# Patient Record
Sex: MALE | Race: WHITE
[De-identification: names, ages, dates, MRNs, and addresses within clinical notes are randomized per-mention and may not be internally consistent; named-entity substitution may affect disease eponyms.]

---

## 2018-07-27 ENCOUNTER — HOSPITAL ENCOUNTER (EMERGENCY)
Dept: HOSPITAL 97 - ER | Age: 48
Discharge: HOME | End: 2018-07-27
Payer: COMMERCIAL

## 2018-07-27 VITALS — SYSTOLIC BLOOD PRESSURE: 122 MMHG | DIASTOLIC BLOOD PRESSURE: 69 MMHG | OXYGEN SATURATION: 95 %

## 2018-07-27 DIAGNOSIS — F17.210: ICD-10-CM

## 2018-07-27 DIAGNOSIS — E66.9: ICD-10-CM
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
## 2018-07-27 DIAGNOSIS — R07.9: Primary | ICD-10-CM

## 2018-07-27 DIAGNOSIS — E11.9: ICD-10-CM

## 2018-07-27 DIAGNOSIS — I10: ICD-10-CM
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
## 2018-07-27 DIAGNOSIS — Z88.5: ICD-10-CM

## 2018-07-27 DIAGNOSIS — Z79.84: ICD-10-CM
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
## 2018-07-27 LAB
ALBUMIN SERPL BCP-MCNC: 3.7 G/DL (ref 3.4–5)
ALP SERPL-CCNC: 101 U/L (ref 45–117)
ALT SERPL W P-5'-P-CCNC: 41 U/L (ref 12–78)
AST SERPL W P-5'-P-CCNC: 23 U/L (ref 15–37)
BUN BLD-MCNC: 21 MG/DL (ref 7–18)
CKMB CREATINE KINASE MB: 3.3 NG/ML (ref 0.3–3.6)
GLUCOSE SERPLBLD-MCNC: 224 MG/DL (ref 74–106)
HCT VFR BLD CALC: 42.2 % (ref 39.6–49)
INR BLD: 0.95
LIPASE SERPL-CCNC: 84 U/L (ref 73–393)
LYMPHOCYTES # SPEC AUTO: 1.2 K/UL (ref 0.7–4.9)
MAGNESIUM SERPL-MCNC: 2 MG/DL (ref 1.8–2.4)
MCH RBC QN AUTO: 31.5 PG (ref 27–35)
MCV RBC: 92.6 FL (ref 80–100)
NT-PROBNP SERPL-MCNC: 36 PG/ML (ref ?–125)
PMV BLD: 10 FL (ref 7.6–11.3)
POTASSIUM SERPL-SCNC: 4.2 MMOL/L (ref 3.5–5.1)
RBC # BLD: 4.56 M/UL (ref 4.33–5.43)

## 2018-07-27 PROCEDURE — 82553 CREATINE MB FRACTION: CPT

## 2018-07-27 PROCEDURE — 71260 CT THORAX DX C+: CPT

## 2018-07-27 PROCEDURE — 85730 THROMBOPLASTIN TIME PARTIAL: CPT

## 2018-07-27 PROCEDURE — 72125 CT NECK SPINE W/O DYE: CPT

## 2018-07-27 PROCEDURE — 83735 ASSAY OF MAGNESIUM: CPT

## 2018-07-27 PROCEDURE — 85610 PROTHROMBIN TIME: CPT

## 2018-07-27 PROCEDURE — 80048 BASIC METABOLIC PNL TOTAL CA: CPT

## 2018-07-27 PROCEDURE — 36415 COLL VENOUS BLD VENIPUNCTURE: CPT

## 2018-07-27 PROCEDURE — 96375 TX/PRO/DX INJ NEW DRUG ADDON: CPT

## 2018-07-27 PROCEDURE — 84484 ASSAY OF TROPONIN QUANT: CPT

## 2018-07-27 PROCEDURE — 80076 HEPATIC FUNCTION PANEL: CPT

## 2018-07-27 PROCEDURE — 83690 ASSAY OF LIPASE: CPT

## 2018-07-27 PROCEDURE — 83880 ASSAY OF NATRIURETIC PEPTIDE: CPT

## 2018-07-27 PROCEDURE — 71045 X-RAY EXAM CHEST 1 VIEW: CPT

## 2018-07-27 PROCEDURE — 74177 CT ABD & PELVIS W/CONTRAST: CPT

## 2018-07-27 PROCEDURE — 99285 EMERGENCY DEPT VISIT HI MDM: CPT

## 2018-07-27 PROCEDURE — 85025 COMPLETE CBC W/AUTO DIFF WBC: CPT

## 2018-07-27 PROCEDURE — 96374 THER/PROPH/DIAG INJ IV PUSH: CPT

## 2018-07-27 PROCEDURE — 82550 ASSAY OF CK (CPK): CPT

## 2018-07-27 PROCEDURE — 70450 CT HEAD/BRAIN W/O DYE: CPT

## 2018-07-27 PROCEDURE — 93005 ELECTROCARDIOGRAM TRACING: CPT

## 2018-07-27 PROCEDURE — 96361 HYDRATE IV INFUSION ADD-ON: CPT

## 2018-07-27 NOTE — RAD REPORT
EXAM DESCRIPTION:  RAD - Chest Single View - 7/27/2018 1:32 pm

 

CLINICAL HISTORY:  COUGH

Chest pain.

 

COMPARISON:  CHEST SINGLE VIEW dated 6/2/2008

 

FINDINGS:  Portable technique limits examination quality.

 

The lungs are grossly clear. The heart is normal in size. No displaced fractures.

 

IMPRESSION:  No acute intrathoracic process suspected.

## 2018-07-27 NOTE — EDPHYS
Physician Documentation                                                                           

 [facility]                                                                

Name: Larry Corea                                                                                 

Age: 48 yrs                                                                                       

Sex: Male                                                                                         

: 1970                                                                                   

MRN: [id number]                                                                                   

Arrival Date: 2018                                                                          

Time: 12:20                                                                                       

Account#: [id number]                                                                            

Bed 5                                                                                             

Private MD: [facility]cal[facility] ED Physician Jacek Aquino                                                                      

HPI:                                                                                              

                                                                                             

13:06 This 48 yrs old  Male presents to ER via Wheelchair with complaints of Chest   sofia 

      Pain.                                                                                       

13:06 The patient or guardian reports chest pain that is located primarily in the anterior    sofia 

      chest wall, left. Onset: 3 day(s) ago. The pain does not radiate. Associated signs and      

      symptoms: The patient has no apparent associated signs or symptoms. The chest pain is       

      described as sharp. Modifying factors: The symptoms are alleviated by the symptoms are      

      aggravated by deep breath, movement, palpation of area, twisting torso. Severity of         

      pain: At its worst the pain was moderate.                                                   

                                                                                                  

Historical:                                                                                       

- Allergies:                                                                                      

12:31 Morphine;                                                                               aj1 

- Home Meds:                                                                                      

12:31 blood pressure [Active]; diabetes pills [Active];                                       aj1 

- PMHx:                                                                                           

12:31 Diabetes - NIDDM; Gout; Hypertension;                                                   aj1 

- PSHx:                                                                                           

12:31 Knee surgery;                                                                           aj1 

                                                                                                  

- Immunization history:: Flu vaccine is up to date.                                               

- Social history:: Smoking status: Patient uses tobacco products, smokes one pack                 

  cigarettes per day.                                                                             

- Ebola Screening: : Patient denies travel to an Ebola-affected area in the 21 days               

  before illness onset.                                                                           

- Family history:: not pertinent.                                                                 

                                                                                                  

                                                                                                  

ROS:                                                                                              

13:06 Constitutional: Negative for fever, chills, and weight loss, Eyes: Negative for injury, sofia 

      pain, redness, and discharge, ENT: Negative for injury, pain, and discharge, Neck:          

      Negative for injury, pain, and swelling, Cardiovascular: Negative for chest pain,           

      palpitations, and edema, Abdomen/GI: Negative for abdominal pain, nausea, vomiting,         

      diarrhea, and constipation, Back: Negative for injury and pain, : Negative for            

      injury, bleeding, discharge, and swelling, MS/Extremity: Negative for injury and            

      deformity, Skin: Negative for injury, rash, and discoloration, Neuro: Negative for          

      headache, weakness, numbness, tingling, and seizure, Psych: Negative for depression,        

      anxiety, suicide ideation, homicidal ideation, and hallucinations, Allergy/Immunology:      

      Negative for hives, rash, and allergies, Endocrine: Negative for neck swelling,             

      polydipsia, polyuria, polyphagia, and marked weight changes, Hematologic/Lymphatic:         

      Negative for swollen nodes, abnormal bleeding, and unusual bruising.                        

13:06 Respiratory: Positive for pleurisy, of the anterior aspect of left upper chest, left        

      lateral anterior chest, left lateral posterior chest, left nipple and left breast,          

      shortness of breath.                                                                        

                                                                                                  

Exam:                                                                                             

13:06 Constitutional:  This is a well developed, well nourished patient who is awake, alert,  sofia 

      and in no acute distress. Head/Face:  Normocephalic, atraumatic. Eyes:  Pupils equal        

      round and reactive to light, extra-ocular motions intact.  Lids and lashes normal.          

      Conjunctiva and sclera are non-icteric and not injected.  Cornea within normal limits.      

      Periorbital areas with no swelling, redness, or edema. ENT:  Nares patent. No nasal         

      discharge, no septal abnormalities noted.  Tympanic membranes are normal and external       

      auditory canals are clear.  Oropharynx with no redness, swelling, or masses, exudates,      

      or evidence of obstruction, uvula midline.  Mucous membranes moist. Neck:  Trachea          

      midline, no thyromegaly or masses palpated, and no cervical lymphadenopathy.  Supple,       

      full range of motion without nuchal rigidity, or vertebral point tenderness.  No            

      Meningismus. Chest/axilla:  Normal chest wall appearance and motion.  Nontender with no     

      deformity.  No lesions are appreciated. Cardiovascular:  Regular rate and rhythm with a     

      normal S1 and S2.  No gallops, murmurs, or rubs.  Normal PMI, no JVD.  No pulse             

      deficits. Abdomen/GI:  Soft, non-tender, with normal bowel sounds.  No distension or        

      tympany.  No guarding or rebound.  No evidence of tenderness throughout. Back:  No          

      spinal tenderness.  No costovertebral tenderness.  Full range of motion. Male :           

      Normal genitalia with no discharge or lesions. Skin:  Warm, dry with normal turgor.         

      Normal color with no rashes, no lesions, and no evidence of cellulitis. MS/ Extremity:      

      Pulses equal, no cyanosis.  Neurovascular intact.  Full, normal range of motion. Neuro:     

       Awake and alert, GCS 15, oriented to person, place, time, and situation.  Cranial          

      nerves II-XII grossly intact.  Motor strength 5/5 in all extremities.  Sensory grossly      

      intact.  Cerebellar exam normal.  Normal gait. Psych:  Awake, alert, with orientation       

      to person, place and time.  Behavior, mood, and affect are within normal limits.            

13:06 Respiratory: the patient does not display signs of respiratory distress,  Respirations:     

      normal, Breath sounds: are clear throughout, Respiratory rate:  24                          

                                                                                                  

Vital Signs:                                                                                      

12:31  / 69; Pulse 114; Resp 24; Pulse Ox 95% on R/A; Weight 185.52 kg (R); Height 6    aj1 

      ft. 3 in. (190.50 cm) (R); Pain 10/10;                                                      

12:31 Body Mass Index 51.12 (185.52 kg, 190.50 cm)                                            aj1 

                                                                                                  

MDM:                                                                                              

12:32 Patient medically screened.                                                             [facility] 

13:06 Data reviewed: vital signs, nurses notes, lab test result(s), EKG, radiologic studies,  [facility] 

      CT scan, plain films.                                                                       

                                                                                                  

                                                                                             

13:06 Order name: Basic Metabolic Panel; Complete Time: 14:36                                 [facility] 

                                                                                             

13:06 Order name: CBC with Diff                                                               [facility] 

                                                                                             

13:06 Order name: Ckmb; Complete Time: 14:36                                                  [facility] 

                                                                                             

13:06 Order name: CPK; Complete Time: 14:36                                                   [facility] 

                                                                                             

13:06 Order name: LFT's; Complete Time: 14:36                                                 [facility] 

                                                                                             

13:06 Order name: Magnesium; Complete Time: 14:36                                             [facility]ia 

                                                                                             

13:06 Order name: NT PRO-BNP; Complete Time: 14:36                                            [facility] 

                                                                                             

13:06 Order name: PT-INR; Complete Time: 14:36                                                [facility] 

                                                                                             

13:06 Order name: Ptt, Activated; Complete Time: 14:36                                        [facility] 

                                                                                             

13:06 Order name: Troponin (emerg Dept Use Only); Complete Time: 14:36                        [facility] 

                                                                                             

13:06 Order name: XRAY Chest (1 view); Complete Time: 15:09                                   [facility] 

                                                                                             

13:06 Order name: Lipase; Complete Time: 14:36                                                [facility] 

                                                                                             

13:06 Order name: CT Traumagram (Head C Spine CAP W Con); Complete Time: 15:09                                                                                                             

13:33 Order name: INCENTIVE SPIROMETRY                                                        [facility] 

                                                                                             

13:06 Order name: EKG; Complete Time: 13:07                                                   [facility] 

                                                                                             

13:06 Order name: Cardiac monitoring; Complete Time: 14:18                                    [facility] 

                                                                                             

13:06 Order name: EKG - Nurse/Tech; Complete Time: 14:                                      [facility] 

                                                                                             

13:06 Order name: IV Saline Lock; Complete Time: :                                        [facility] 

                                                                                             

13:06 Order name: Labs collected and sent; Complete Time: :                               [facility] 

                                                                                             

13:06 Order name: O2 Per Protocol; Complete Time: :                                       [facility] 

                                                                                             

13:06 Order name: O2 Sat Monitoring; Complete Time: :                                     [facility] 

                                                                                                  

Administered Medications:                                                                         

14:17 Drug: fentaNYL (PF) 50 mcg Route: IVP; Site: right antecubital;                         ph  

15:30 Follow up: Response: No adverse reaction                                                ph  

14:17 Drug: Zofran 4 mg Route: IVP; Site: right antecubital;                                  ph  

15:30 Follow up: Response: No adverse reaction                                                ph  

14:18 Drug: NS 0.9% 1000 ml Route: IV; Rate: 1 bolus; Site: right antecubital;                ph  

19:36 Follow up: Response: No adverse reaction; IV Status: Completed infusion                 ph  

                                                                                                  

                                                                                                  

Disposition:                                                                                      

18 15:10 Discharged to Home. Impression: Chest pain, unspecified - wall, Fall due to        

  bumping against object, Type 2 diabetes mellitus, Obesity, unspecified.                         

- Condition is Stable.                                                                            

- Discharge Instructions: Chest Wall Pain, Fall Prevention in the Home, Chest Wall                

  Pain, Easy-to-Read, Fall Prevention in the Home, Easy-to-Read.                                  

- Prescriptions for Ibuprofen 600 mg Oral Tablet - take 1 tablet by ORAL route every 6            

  hours As needed take with food; 2 tablet. Tylenol- Codeine #3 300-30 mg Oral Tablet -           

  take 2 tablet by ORAL route every 6 hours As needed; 30 tablet.                                 

- Medication Reconciliation Form, Thank You Letter, Antibiotic Education, Prescription            

  Opioid Use form.                                                                                

- Follow up: Fernando Alcala; When: 2 - 3 days; Reason: Recheck today's complaints,                    

  Continuance of care, Re-evaluation by your physician. Follow up: Daniel Burch;                

  When: 2 - 3 days; Reason: Recheck today's complaints, Continuance of care,                      

  Re-evaluation by your physician.                                                                

- Problem is new.                                                                                 

- Symptoms have improved.                                                                         

                                                                                                  

                                                                                                  

                                                                                                  

Signatures:                                                                                       

Dispatcher [employer]                           Desirae Stearns RN                     RN   aj1                                                  

Jacek Aquino MD MD cha Hall, Patricia RN                      RN   ph                                                   

                                                                                                  

Corrections: (The following items were deleted from the chart)                                    

15:31 15:10 2018 15:10 Discharged to Home. Impression: Chest pain, unspecified - wall;  ph  

      Fall due to bumping against object; Type 2 diabetes mellitus; Obesity, unspecified.         

      Condition is Stable. Discharge Instructions: Chest Wall Pain, Fall Prevention in the        

      Home, Chest Wall Pain, Easy-to-Read, Fall Prevention in the Home, Easy-to-Read.             

      Prescriptions for Ibuprofen 600 mg Oral Tablet - take 1 tablet by ORAL route every 6        

      hours As needed take with food; 2 tablet, Tylenol-Codeine #3 300-30 mg Oral Tablet -        

      take 2 tablet by ORAL route every 6 hours As needed; 30 tablet. and Forms are               

      Medication Reconciliation Form, Thank You Letter, Antibiotic Education, Prescription        

      Opioid Use. Follow up: Fernando Alcala; When: 2 - 3 days; Reason: Recheck today's                

      complaints, Continuance of care, Re-evaluation by your physician. Follow up: Daniel Burch; When: 2 - 3 days; Reason: Recheck today's complaints, Continuance of care,        

      Re-evaluation by your physician. Problem is new. Symptoms have improved. sofia                

                                                                                                  

**************************************************************************************************

## 2018-07-27 NOTE — EKG
Test Date:    2018-07-27               Test Time:    12:36:49

Technician:   JERI                                     

                                                     

MEASUREMENT RESULTS:                                       

Intervals:                                           

Rate:         117                                    

ME:           176                                    

QRSD:         156                                    

QT:           336                                    

QTc:          468                                    

Axis:                                                

P:            70                                     

ME:           176                                    

QRS:          97                                     

T:            42                                     

                                                     

INTERPRETIVE STATEMENTS:                                       

                                                     

Sinus tachycardia

Right bundle branch block

Septal infarct, age undetermined

Abnormal ECG

Compared to ECG 08/12/2015 16:47:27

Myocardial infarct finding now present



Electronically Signed On 07-27-18 18:06:44 CDT by Larry Martinez

## 2018-07-27 NOTE — RAD REPORT
EXAM DESCRIPTION:  CT - Head C  Spine Cap SVEN Con - 7/27/2018 2:45 pm

 

CLINICAL HISTORY:  Trauma, head and neck injury.  Chest, abdomen and pelvis pain.

PAIN

 

COMPARISON:  No comparisons

 

TECHNIQUE:  CT head without contrast.

 

CT cervical spine without contrast with coronal and sagittal reformatted images.

 

CT chest, abdomen and pelvis with IV contrast (approximately 100 mL nonionic IV contrast) with corona
l and sagittal reformatted images of the spine.

 

All CT scans are performed using dose optimization technique as appropriate and may include automated
 exposure control or mA/KV adjustment according to patient size.

 

FINDINGS:  CT HEAD WITHOUT CONTRAST:

 

No intracranial hemorrhage, hydrocephalus or extra-axial fluid collection.  No areas of brain edema o
r midline shift.

 

The paranasal sinuses and mastoids are clear. The calvarium is intact.

 

 

CT CERVICAL SPINE WITHOUT CONTRAST:

 

No fracture or subluxation.  The prevertebral soft tissues are normal in thickness.

 

 

CT CHEST, ABDOMEN, PELVIS WITH CONTRAST:

 

The lungs are clear.No pneumothorax or pericardial/pleural fluid.

 

No evidence of intra-abdominal visceral injury, free fluid or free air.

 

No concerning pelvic findings.

 

No fractures. IVC filter is noted. Hardware is present in the right acetabulum posteriorly.

 

IMPRESSION:  Negative for acute traumatic findings.

## 2018-07-27 NOTE — ER
Nurse's Notes                                                                                     

 [facility]                                                                

Name: Larry Corea                                                                                 

Age: 48 yrs                                                                                       

Sex: Male                                                                                         

: 1970                                                                                   

MRN: [id number]                                                                                   

Arrival Date: 2018                                                                          

Time: 12:20                                                                                       

Account#: [id number]                                                                            

Bed 5                                                                                             

Private MD: Fernando Alcala                                                                           

Diagnosis: Chest pain, unspecified-wall;Fall due to bumping against object;Type 2 diabetes        

  mellitus;Obesity, unspecified                                                                   

                                                                                                  

Presentation:                                                                                     

                                                                                             

12:26 Presenting complaint: Patient states: "Wednesday I fell on my porch, and hit the        aj1 

      handrail and I thought it was just some cracked ribs. I woke up this morning and I just     

      don't feel right." Reports pain directly under left pectoral area, shortness of breath,     

      palpitations. Transition of care: patient was not received from another setting of          

      care. Onset of symptoms was 2018. Risk Assessment: Do you want to hurt             

      yourself or someone else? Patient reports no desire to harm self or others. Initial         

      Sepsis Screen: Does the patient meet any 2 criteria? RR > 20 per min. HR > 90 bpm. Yes      

      Does the patient have a suspected source of infection? No. Patient's initial sepsis         

      screen is negative. Care prior to arrival: None.                                            

12:26 Method Of Arrival: Wheelchair                                                           aj1 

12:26 Acuity: SHITAL 3                                                                           aj1 

                                                                                                  

Triage Assessment:                                                                                

12:31 General: Appears uncomfortable, Behavior is calm, cooperative, appropriate for age.     aj1 

      Pain: Complains of pain in diaphragm Pain does not radiate. Pain currently is 10 out of     

      10 on a pain scale. Neuro: Level of Consciousness is awake, alert, obeys commands.          

      Cardiovascular: Reports chest pain, palpitations, shortness of breath.                      

                                                                                                  

Historical:                                                                                       

- Allergies:                                                                                      

12:31 Morphine;                                                                               aj1 

- Home Meds:                                                                                      

12:31 blood pressure [Active]; diabetes pills [Active];                                       aj1 

- PMHx:                                                                                           

12:31 Diabetes - NIDDM; Gout; Hypertension;                                                   aj1 

- PSHx:                                                                                           

12:31 Knee surgery;                                                                           aj1 

                                                                                                  

- Immunization history:: Flu vaccine is up to date.                                               

- Social history:: Smoking status: Patient uses tobacco products, smokes one pack                 

  cigarettes per day.                                                                             

- Ebola Screening: : Patient denies travel to an Ebola-affected area in the 21 days               

  before illness onset.                                                                           

- Family history:: not pertinent.                                                                 

                                                                                                  

                                                                                                  

Screenin:00 Abuse screen: Denies threats or abuse. Denies injuries from another. Nutritional        ph  

      screening: No deficits noted. Tuberculosis screening: No symptoms or risk factors           

      identified. Fall Risk None identified.                                                      

                                                                                                  

Assessment:                                                                                       

13:00 General: Appears in no apparent distress. uncomfortable, obese, well groomed, Behavior  ph  

      is calm, cooperative, appropriate for age. Pain: Complains of pain in left lateral          

      anterior chest and left breast Pain began 2-3 days ago. Neuro: Level of Consciousness       

      is awake, alert, obeys commands, Oriented to person, place, time, situation.                

      Cardiovascular: Reports chest pain, shortness of breath, Denies nausea, syncope,            

      vomiting. Respiratory: Reports shortness of breath at rest pain with cough pain with        

      movement pain with respiration Airway is patent Respiratory effort is even, unlabored,      

      Respiratory pattern is regular, symmetrical, Breath sounds are clear bilaterally. GI:       

      No signs and/or symptoms were reported involving the gastrointestinal system. Derm:         

      Skin is intact, is healthy with good turgor, Skin is pink, warm \T\ dry. Musculoskeletal:   

      Circulation, motion, and sensation intact. Range of motion: intact in all extremities.      

14:00 Reassessment: Patient appears in no apparent distress at this time. Patient and/or      ph  

      family updated on plan of care and expected duration. Pain level reassessed. Patient is     

      alert, oriented x 3, equal unlabored respirations, skin warm/dry/pink.                      

15:30 Reassessment: Patient appears in no apparent distress at this time. Patient and/or      ph  

      family updated on plan of care and expected duration. Pain level reassessed. Patient is     

      alert, oriented x 3, equal unlabored respirations, skin warm/dry/pink. Pt instructed on     

      use of incentive spirometer and d/c home w/ family.                                         

                                                                                                  

Vital Signs:                                                                                      

12:31  / 69; Pulse 114; Resp 24; Pulse Ox 95% on R/A; Weight 185.52 kg (R); Height 6    aj1 

      ft. 3 in. (190.50 cm) (R); Pain 10/10;                                                      

12:31 Body Mass Index 51.12 (185.52 kg, 190.50 cm)                                            aj1 

                                                                                                  

ED Course:                                                                                        

12:20 Patient arrived in ED.                                                                  as  

12:20 Fernando Alcala is Private Physician.                                                       as  

12:30 Triage completed.                                                                       aj1 

12:31 Arm band placed on Patient placed in an exam room.                                      aj1 

12:32 Jacek Aquino MD is Attending Physician.                                             sofia 

13:00 Patient has correct armband on for positive identification. Placed in gown. Bed in low  ph  

      position. Call light in reach. Side rails up X 1. Cardiac monitor on. Pulse ox on. NIBP     

      on. Warm blanket given.                                                                     

13:00 Inserted saline lock: 20 gauge in right antecubital area, using aseptic technique.      ag  

      Blood collected.                                                                            

13:29 X-ray completed. Portable x-ray completed in exam room. Patient tolerated procedure     jb2 

      well.                                                                                       

13:32 XRAY Chest (1 view) In Process Unspecified.                                             EDMS

14:03 Radiology exam delayed due to lab results not completed at this time. (BUN/Creatinine).   

14:05 Augustina Mello, RN is Primary Nurse.                                                    ph  

14:17 Radiology exam delayed due to lab results not completed at this time. (BUN/Creatinine). nj  

14:36 Patient moved to CT via stretcher.                                                      sj  

14:45 CT Traumagram (Head C Spine CAP W Con) In Process Unspecified.                          EDMS

15:10 Fernando Alcala is Referral Physician.                                                      sofia 

15:10 Daniel Burch MD is Referral Physician.                                              sofia 

15:30 No provider procedures requiring assistance completed. IV discontinued, intact,         ph  

      bleeding controlled, No redness/swelling at site. Pressure dressing applied. Patient        

      maintains SpO2 saturation greater than 95% on room air.                                     

                                                                                                  

Administered Medications:                                                                         

14:17 Drug: fentaNYL (PF) 50 mcg Route: IVP; Site: right antecubital;                         ph  

15:30 Follow up: Response: No adverse reaction                                                ph  

14:17 Drug: Zofran 4 mg Route: IVP; Site: right antecubital;                                  ph  

15:30 Follow up: Response: No adverse reaction                                                ph  

14:18 Drug: NS 0.9% 1000 ml Route: IV; Rate: 1 bolus; Site: right antecubital;                ph  

19:36 Follow up: Response: No adverse reaction; IV Status: Completed infusion                 ph  

                                                                                                  

                                                                                                  

Outcome:                                                                                          

15:10 Discharge ordered by MD.                                                                sofia 

15:30 Discharged to home ambulatory, with family.                                             ph  

15:30 Condition: good                                                                             

15:30 Discharge instructions given to patient, Instructed on discharge instructions, follow       

      up and referral plans. medication usage, Demonstrated understanding of instructions,        

      follow-up care, medications, Prescriptions given X 2.                                       

15:31 Patient left the ED.                                                                    ph  

                                                                                                  

Signatures:                                                                                       

Dispatcher [employer]                           Desirae Stearns, BEATRIZ                     RN   [email or web address]                                                  

Jacek Aquino MD MD cha Buechter, Joaquim Welsh, Katerina Collier, Augustina Prabhakar, BEATRIZ                      RN                                                      

Víctor, Moriah Hernandez, [place] galvan                                                   

                                                                                                  

**************************************************************************************************

## 2019-07-01 LAB
BUN BLD-MCNC: 18 MG/DL (ref 7–18)
GLUCOSE SERPLBLD-MCNC: 139 MG/DL (ref 74–106)
HCT VFR BLD CALC: 48.1 % (ref 39.6–49)
LYMPHOCYTES # SPEC AUTO: 2.2 K/UL (ref 0.7–4.9)
PMV BLD: 9.3 FL (ref 7.6–11.3)
POTASSIUM SERPL-SCNC: 4.2 MMOL/L (ref 3.5–5.1)
RBC # BLD: 5.29 M/UL (ref 4.33–5.43)

## 2019-07-01 NOTE — EKG
Test Date:    2019-07-01               Test Time:    17:28:57

Technician:   REN                                     

                                                     

MEASUREMENT RESULTS:                                       

Intervals:                                           

Rate:         76                                     

WY:           192                                    

QRSD:         160                                    

QT:           414                                    

QTc:          465                                    

Axis:                                                

P:            49                                     

WY:           192                                    

QRS:          118                                    

T:            44                                     

                                                     

INTERPRETIVE STATEMENTS:                                       

                                                     

Normal sinus rhythm

Right bundle branch block

Septal infarct, age undetermined

Abnormal ECG

Compared to ECG 07/27/2018 12:36:49

Sinus tachycardia no longer present

Myocardial infarct finding still present



Electronically Signed On 07-01-19 19:40:14 CDT by Jeffery Carlos

## 2019-07-01 NOTE — RAD REPORT
EXAM DESCRIPTION:  Rg Guerrero (2 Views)7/1/2019 6:03 pm

 

CLINICAL HISTORY:  Preop for cholecystectomy. Abdominal pain

 

COMPARISON:  July 2018

 

FINDINGS:   The lungs appear clear of acute infiltrate. The heart is mildly enlarged

 

IMPRESSION:   No acute abnormalities displayed

## 2019-07-02 ENCOUNTER — HOSPITAL ENCOUNTER (OUTPATIENT)
Dept: HOSPITAL 97 - OR | Age: 49
Discharge: HOME | End: 2019-07-02
Attending: SURGERY
Payer: COMMERCIAL

## 2019-07-02 DIAGNOSIS — E11.9: ICD-10-CM

## 2019-07-02 DIAGNOSIS — E66.01: ICD-10-CM

## 2019-07-02 DIAGNOSIS — K42.9: ICD-10-CM

## 2019-07-02 DIAGNOSIS — G47.33: ICD-10-CM

## 2019-07-02 DIAGNOSIS — K35.80: Primary | ICD-10-CM

## 2019-07-02 DIAGNOSIS — K40.20: ICD-10-CM

## 2019-07-02 DIAGNOSIS — Z72.0: ICD-10-CM

## 2019-07-02 DIAGNOSIS — Z88.6: ICD-10-CM

## 2019-07-02 PROCEDURE — 82962 GLUCOSE BLOOD TEST: CPT

## 2019-07-02 PROCEDURE — 0WQF0ZZ REPAIR ABDOMINAL WALL, OPEN APPROACH: ICD-10-PCS

## 2019-07-02 PROCEDURE — 85025 COMPLETE CBC W/AUTO DIFF WBC: CPT

## 2019-07-02 PROCEDURE — 88302 TISSUE EXAM BY PATHOLOGIST: CPT

## 2019-07-02 PROCEDURE — 0DTJ4ZZ RESECTION OF APPENDIX, PERCUTANEOUS ENDOSCOPIC APPROACH: ICD-10-PCS

## 2019-07-02 PROCEDURE — 88304 TISSUE EXAM BY PATHOLOGIST: CPT

## 2019-07-02 PROCEDURE — 80048 BASIC METABOLIC PNL TOTAL CA: CPT

## 2019-07-02 PROCEDURE — 93005 ELECTROCARDIOGRAM TRACING: CPT

## 2019-07-02 PROCEDURE — 71046 X-RAY EXAM CHEST 2 VIEWS: CPT

## 2019-07-02 PROCEDURE — 36415 COLL VENOUS BLD VENIPUNCTURE: CPT

## 2019-07-02 RX ADMIN — HYDROMORPHONE HYDROCHLORIDE ONE MG: 1 INJECTION, SOLUTION INTRAMUSCULAR; INTRAVENOUS; SUBCUTANEOUS at 13:00

## 2019-07-02 RX ADMIN — HYDROMORPHONE HYDROCHLORIDE ONE MG: 2 INJECTION INTRAMUSCULAR; INTRAVENOUS; SUBCUTANEOUS at 12:45

## 2019-07-02 RX ADMIN — HYDROMORPHONE HYDROCHLORIDE ONE MG: 1 INJECTION, SOLUTION INTRAMUSCULAR; INTRAVENOUS; SUBCUTANEOUS at 13:07

## 2019-07-02 RX ADMIN — HYDROMORPHONE HYDROCHLORIDE ONE MG: 2 INJECTION INTRAMUSCULAR; INTRAVENOUS; SUBCUTANEOUS at 12:55

## 2019-07-02 RX ADMIN — HYDROMORPHONE HYDROCHLORIDE ONE MG: 2 INJECTION INTRAMUSCULAR; INTRAVENOUS; SUBCUTANEOUS at 12:50

## 2019-07-02 RX ADMIN — HYDROMORPHONE HYDROCHLORIDE ONE MG: 2 INJECTION INTRAMUSCULAR; INTRAVENOUS; SUBCUTANEOUS at 12:40

## 2019-07-02 NOTE — DS
Date of Discharge:  07/02/2019



Diagnoses:  Acute appendicitis.  Umbilical hernia.



Procedures:  Laparoscopic appendectomy, open repair of umbilical hernia.



Disposition:  Home.



Discharge Instruction:  Activity as tolerated.  No heavy lifting.  Follow up in my office in 1 week. 
 Call for appointment [phone].  Keep area dry for 48 hours, then may shower.  Medication includes Ci
pro 500 p.o. q.12, Flagyl 500 p.o. q.6, and Tylenol #3 q.4 hours p.r.n. pain.  The patient, whenever 
it is clinically possible, then he is going to have the hernias repair on the inguinal region.





LAMONT/JOSE

DD:  07/02/2019 13:00:20Voice ID:  [id number]

DT:  07/02/2019 20:31:41Report ID:  [id number]

## 2019-07-02 NOTE — OP
Date of Procedure:  07/02/2019



Surgeon:  Daniel Burch MD



Preoperative Diagnoses:  Umbilical hernia.  Acute appendicitis.  Bilateral inguinal hernias.



Postoperative Diagnoses:  Umbilical hernia.  Acute appendicitis.  Bilateral inguinal hernias.



Procedures:  Laparoscopic appendectomy and open repair of a tender umbilical hernia.



Anesthesia:  General plus local.



Findings:  Acute appendicitis and hernia.



Indications:  This is the case of a 48-year-old patient, with initial situation, he has bilateral ing
uinal hernias, umbilical hernias.  He said lifting something he hurt the right side and then when we 
did the CT scan to look for the hernias, we also found patient has appendicitis.  Pros and cons discu
ssed with the patient.  We decided to do this in stages.  We going to do today the appendix and the u
mbilical hernia.  Not the inguinal hernias because they may require mesh and may get contaminated wit
h the appendicitis, so he understood, also the importance of losing weight, signed the consent.  The 
benefits, alternatives, and risks of appendectomy and hernia repair fully explained to the patient, w
hich include but are not limited to infection, bleeding, damage to adjacent structures, anesthesia co
mplication, negative appendix, MI, and even death.  He also understands this may not relieve any symp
toms.  He might need more than one surgical intervention.  He understood, signed a consent.



Description Of Procedure:  The patient was brought to the operating room, placed in supine position. 
 Anesthesia was done without complication.  Abdominal area was prepped and draped in a sterile fashio
n.  Local anesthesia was applied in the umbilical region.  Incision was carried down.  Hernia was fou
nd with incarcerated omentum.  Carefully reduced into the abdominal cavity.  Hernia sac was removed. 
 The fascial edges were opened to allow the Santana trocars to come in.  We placed Vicryl #1 inside th
e fascia.  Santana trocar was carefully introduced.  Pneumoperitoneum was obtained.  We directed our a
ttention to the right lower quadrant and indeed patient has inflamed appendix.  Yes, he has umbilical
 hernia.  Yes, he has small inguinal hernias but the appendix is retrocecal with some adhesions attac
hed to it, not allowing dissection of that without using the help of LigaSure.  So, when we put 2 mor
e 5 mm in the suprapubic and left lower quadrant under direct visualization, we were able to remove t
he adhesions in the right lower quadrant using a LigaSure.  Then, we noticed the appendix to be infla
med, partially retrocecal, so the appendix had to be mobilized.  The base of the appendix seems to be
 spared from this inflammation, so we created a window in the base of the appendix, transected that w
ith an Endo YANETH 45 mm 3.5, and the mesoappendix with an Endo YANETH 45 mm 2.5.  Appendix removed from ab
dominal cavity using an EndoCatch through umbilical incision.  Irrigation was done.  Hemostasis was o
btained with the help of LigaSure and hemoclips.  The area was irrigated until clean.  No bowel leak.
  No bleeding.  At that moment, I proceeded to remove the trocars under direct vision.  Before that, 
we looked at the area of the inguinal region.  There are some small hernias present there, but at thi
s moment, there is no incarceration of any intestines.  So, we removed the trocars under direct visio
n.  Deflated the pneumoperitoneum, closed the fascia with #1 Vicryl.  Irrigated subcutaneous tissue, 
closed it with 3-0 chromic and the skin with staples.  Sponge count and instrument counts were correc
t.  The patient tolerated the procedure well.  The patient was sent to recovery in stable condition.





LAMONT/JOSE

DD:  07/02/2019 13:00:20Voice ID:  [id number]

DT:  07/02/2019 20:25:02Report ID:  [id number]

## 2019-07-02 NOTE — HP
Date of Admission:  07/02/2019



Diagnoses:  Acute appendicitis, bilateral inguinal hernias, umbilical hernia.



History Of Present Illness:  This is a case of a 48-year-old patient, morbidly obese patient.  He was
 lifting heavy about a week ago, developed pain of the bilateral inguinal region, also of the right i
nguinal region more than the left side.  He did not make anything out of bed.  He got some rest, but 
has still pain on and off in that right side.  He came to the office, had a CAT scan, found the ingui
nal hernias, found the umbilical hernia, but also we found an interesting finding, which is an inflam
ed appendix with a periappendiceal inflammation of the fat.  He is tolerating diet.  He just have rig
ht in the lower quadrant, his abdomen is so big and it is hard for him to pinpoint, although almost t
he pointed at the inguinal region.  We discussed the case with him and the findings of the hernia, bu
t also the findings of the appendix.  He has an evidence of appendicitis on imaging.  He still has ri
ght lower quadrant area.  He does not have normal picture of appendicitis, but it cannot be rule out 
neither.  At the same time, you can also have a tumors in the appendix and abnormalities that does no
t require patients to have all the symptoms of appendix.  So, he was thinking about it and he decided
 that he is going to do some thing about the appendix first and umbilical hernia and I agree with him
.  At least we have to do diagnostic lap and basically take a look at that appendix since I cannot ru
le it that.  He wanted to do it next week if possible finding some job, but I told him that from the 
appendix standpoint it is difficult to wait hernias.  If he does not do heavy lifting, he might do th
at, but for appendix, we would like to do that as soon as possible.  So, he allowed me to do that tocas
ay.  He denies any dysuria, hematuria, hematochezia, or melena.  He denies any recent traveling out o
f the country.  Denies any family member sick at home.  No previous colonoscopy.  He was advised impo
rtance of losing weight and not doing heavy lifting.



Past Medical History:  Morbid obesity.



Allergies:  MORPHINE.



Past Surgical History:  He says he has a knee surgery and a hip surgery, although he does not remembe
r exactly the details of it.



Social History:  He does not smoke.  He does not drink alcohol.



Family History:  Noncontributory.



Review of Systems:

Ten points otherwise unremarkable.



Physical Examination:

General:  The patient is awake and alert. 

HEENT:  Pupils are equal and reactive, anicteric. 

Neck:  Supple. 

Chest:  Clear. 

Abdomen:  Right lower quadrant tenderness, also inguinal mild tenderness.  He has right lower quadran
t tenderness, although he does not have any psoas signs.  It is still significant.  He has a large ab
domen, morbidly obese.  So, it is hard to pinpoint exact location.  Inguinal region, you cannot feel 
the hernias.  He is too big in that area to palpate the hernias, but the CAT scan showed them in that
 region. 

Rectal:  Deferred. 

Genitalia:  Deferred. 

Extremities:  Good capillary refill. 

Neurologic:  Creatinine nerves 2 through 12 grossly within normal limits.



Laboratory Data:  WBC count is 9 with hemoglobin of 15.  Chloride is 103, potassium is 4.2, creatinin
e is 0.97.  CAT scan of abdomen and pelvis interpreted by Dr. Massey shows small bilateral inguinal
 hernias with incarcerated fat, also umbilical hernia with incarcerated fat and mild enlargement of t
he appendix with mild stranding of the adjacent fat, suspicious for appendicitis.



Assessment:  A 48-year-old patient with an unusual situation.  He was lifting something heavy, develo
ped pain in inguinal region, developed pain in that area.  When we did the work, we found also the pa
tient to have appendicitis.  He has right lower quadrant pain.  He is pointing the lifting as the cau
se of that.  On the same time, we cannot rule out appendicitis like anybody else and the CAT scan caden
ws that.  If it is not related to bacteria, he may have a tumor in that area.  It shows no abnormalit
ies and warrants some investigation.  He has no previous colonoscopies.  So, we offered him the diagn
ostic lap, appendectomy, and repair of an umbilical hernia at this time, but the inguinal hernias, we
 are not going to do at this time because appendix is inflamed and I believe if I fix the hernias at 
this moment, it may contaminate that area and may be infect the mesh, so may have to be done staging.
  At this moment, we have no intestines going through the hernia, so we asked him to not to do heavy 
lifting, so it does not get worse and we do not have an incarceration of bowel.  He thought about and
 then after that, he gave me the consent for this procedure with benefits, alternatives, and risks in
cluding, but not limited to infection, bleeding, damage to adjacent structures, anesthesia complicati
on, negative appendix, MI, even death.  He also understands this might not relieve the symptoms, he m
ight need more than one surgical intervention as we described above.  It is very important for him to
 lose some weight and he understands that.  The OR was called out and explained.





LAMONT/JOSE

DD:  07/02/2019 11:12:48Voice ID:  [id number]

## 2019-07-03 VITALS — DIASTOLIC BLOOD PRESSURE: 96 MMHG | OXYGEN SATURATION: 92 % | SYSTOLIC BLOOD PRESSURE: 162 MMHG | TEMPERATURE: 97.1 F

## 2021-12-08 ENCOUNTER — HOSPITAL ENCOUNTER (EMERGENCY)
Dept: HOSPITAL 97 - ER | Age: 51
Discharge: HOME | End: 2021-12-08
Payer: COMMERCIAL

## 2021-12-08 VITALS — SYSTOLIC BLOOD PRESSURE: 139 MMHG | DIASTOLIC BLOOD PRESSURE: 94 MMHG

## 2021-12-08 VITALS — OXYGEN SATURATION: 98 %

## 2021-12-08 DIAGNOSIS — F17.210: ICD-10-CM

## 2021-12-08 DIAGNOSIS — Z88.5: ICD-10-CM

## 2021-12-08 DIAGNOSIS — E11.9: ICD-10-CM

## 2021-12-08 DIAGNOSIS — I10: ICD-10-CM

## 2021-12-08 DIAGNOSIS — M54.17: Primary | ICD-10-CM

## 2021-12-08 PROCEDURE — 99284 EMERGENCY DEPT VISIT MOD MDM: CPT

## 2021-12-08 PROCEDURE — 81003 URINALYSIS AUTO W/O SCOPE: CPT

## 2021-12-08 PROCEDURE — 96374 THER/PROPH/DIAG INJ IV PUSH: CPT

## 2021-12-08 PROCEDURE — 72131 CT LUMBAR SPINE W/O DYE: CPT

## 2021-12-08 NOTE — XMS REPORT
Continuity of Care Document

                           Created on:2021



Patient:JUSTIN REYES

Sex:Male

:1970

External Reference #:[id number]





Demographics







                          Address                   [address] [place] [address]

 

                          Home Phone                [phone]

 

                          Work Phone                [phone]

 

                          Email Address             NONE

 

                          Preferred Language        Unknown

 

                          Marital Status            Unknown

 

                          Lutheran Affiliation     Unknown

 

                          Race                      Unknown

 

                          Additional Race(s)        Unavailable

 

                          Ethnic Group              Unknown









Author







                          Organization              [facility]

t

 

                          Address                   [address]



                                                    [place] [address]

 

                          Phone                     [phone]









Care Team Providers







                    Name                Role                Phone

 

                    Unavailable         Unavailable         Unavailable









Problems

This patient has no known problems.



Allergies, Adverse Reactions, Alerts

This patient has no known allergies or adverse reactions.



Medications

This patient has no known medications.



Procedures

This patient has no known procedures.



Results

This patient has no known results.

## 2021-12-08 NOTE — ER
Nurse's Notes                                                                                     

 [facility]                                                                 

Name: Larry Corea                                                                                 

Age: 51 yrs                                                                                       

Sex: Male                                                                                         

: 1970                                                                                   

MRN: [id number]                                                                                   

Arrival Date: 2021                                                                          

Time: 13:32                                                                                       

Account#: [id number]                                                                            

Bed 12                                                                                            

Private MD:                                                                                       

Diagnosis: Low back pain;Radiculopathy, lumbosacral region                                        

                                                                                                  

Presentation:                                                                                     

                                                                                             

14:39 Chief complaint: Patient states: was pushing his michael onto th trailer last Wednesday  iw  

      and hurt his back, has pain radiating from right hip down right leg and into right          

      buttock, went to see a chiropractor and was told he needs and MRI, was not given any        

      medications. Coronavirus screen: At this time, the client does not indicate any             

      symptoms associated with coronavirus-19. Ebola Screen: Patient negative for fever           

      greater than or equal to 101.5 degrees Fahrenheit, and additional compatible Ebola          

      Virus Disease symptoms Patient denies exposure to infectious person. Patient denies         

      travel to an Ebola-affected area in the 21 days before illness onset. No symptoms or        

      risks identified at this time. Initial Sepsis Screen: Does the patient meet any 2           

      criteria? No. Patient's initial sepsis screen is negative. Does the patient have a          

      suspected source of infection? No. Patient's initial sepsis screen is negative. Risk        

      Assessment: Do you want to hurt yourself or someone else? Patient reports no desire to      

      harm self or others. Onset of symptoms was 2021.                               

14:39 Method Of Arrival: Wheelchair                                                           iw  

14:39 Acuity: SHITAL 3                                                                           iw  

                                                                                                  

Historical:                                                                                       

- Allergies:                                                                                      

14:44 Morphine;                                                                               iw  

- Home Meds:                                                                                      

14:44 Metformin Oral [Active]; cholesterol pills [Active]; blood pressure [Active];           iw  

- PMHx:                                                                                           

14:44 Diabetes - NIDDM; Gout; Hypertension; Hypercholesterolemia;                             iw  

                                                                                                  

- Immunization history:: Client reports having NOT received the Covid vaccine.                    

- Social history:: Smoking status: Patient reports the use of cigarette tobacco                   

  products, smokes one pack cigarettes per day.                                                   

                                                                                                  

                                                                                                  

Screening:                                                                                        

15:01 Abuse screen: Denies threats or abuse. Denies injuries from another. Nutritional        ld1 

      screening: No deficits noted. Tuberculosis screening: No symptoms or risk factors           

      identified. Fall Risk None identified.                                                      

                                                                                                  

Assessment:                                                                                       

15:01 General: Appears in no apparent distress. comfortable, Behavior is calm, cooperative,   ld1 

      appropriate for age. Pain: Complains of pain in right hip Pain radiates to right leg        

      Pain currently is 9 out of 10 on a pain scale. Quality of pain is described as sharp,       

      stabbing, Pain began suddenly, Is continuous. Neuro: Level of Consciousness is awake,       

      alert, obeys commands, Oriented to person, place, time, situation, Appropriate for age.     

      Cardiovascular: Capillary refill < 3 seconds Patient's skin is warm and dry.                

      Respiratory: Airway is patent Respiratory effort is even, unlabored, Respiratory            

      pattern is regular, symmetrical. GI: Abdomen is round non-distended. : No signs           

      and/or symptoms were reported regarding the genitourinary system. EENT: No signs and/or     

      symptoms were reported regarding the EENT system. Derm: No signs and/or symptoms            

      reported regarding the dermatologic system. Musculoskeletal: No signs and/or symptoms       

      reported regarding the musculoskeletal system.                                              

                                                                                                  

Vital Signs:                                                                                      

14:43  / 102; Pulse 68; Resp 16; Pulse Ox 96% on R/A; Weight 181.44 kg; Height 6 ft. 3  iw  

      in. (190.50 cm); Pain 6/10;                                                                 

15:01  / 99; Pulse 72; Resp 18; Pulse Ox 98% on R/A; Pain 8/10;                         ld1 

16:28  / 94; Pulse 76; Resp 18; Pulse Ox 98% on R/A;                                    ld1 

14:43 Body Mass Index 50.00 (181.44 kg, 190.50 cm)                                            iw  

                                                                                                  

ED Course:                                                                                        

13:32 Patient arrived in ED.                                                                  ds1 

14:19 Paola Sommer, RN is Primary Nurse.                                                   ld1 

14:43 Triage completed.                                                                       iw  

14:44 Arm band placed on.                                                                     iw  

15:01 Patient has correct armband on for positive identification. Bed in low position. Call   ld1 

      light in reach. Side rails up X2. Pulse ox on. NIBP on. Door closed. Noise minimized.       

      Warm blanket given.                                                                         

15:01 No provider procedures requiring assistance completed.                                  ld1 

15:17 Jacek Naidu PA is PHCP.                                                                cp  

15:17 Flor Graham MD is Attending Physician.                                                cp  

15:34 Inserted saline lock: 20 gauge in left antecubital area, using aseptic technique.       iw  

16:10 CT Lumbar Spine Wo Con In Process Unspecified.                                          EDMS

17:50 IV discontinued, intact, bleeding controlled, No redness/swelling at site.              ld1 

                                                                                                  

Administered Medications:                                                                         

15:46 Not Given (Other Intervention Used): Dilaudid (HYDROmorphone) 1 mg IM once; RASS on     ld1 

      ADMIN: Combtv4, Very Agttd3, Agttd2, Rstlss1, AlertClm0, Drwsy-1, Lt Sdtn-2, Mod            

      Sdtn-3, Dp Sdtn-4, UnArsble-5                                                               

15:46 Drug: Dilaudid (HYDROmorphone) 1 mg Route: IVP; Site: left antecubital;                 ld1 

15:46 Follow up: Response: No adverse reaction                                                ld1 

                                                                                                  

                                                                                                  

Outcome:                                                                                          

17:34 Discharge ordered by MD.                                                                ivanna  

17:49 Discharged to home ambulatory.                                                          ld1 

17:49 Condition: stable                                                                           

17:49 Discharge instructions given to patient, family, Instructed on discharge instructions,      

      follow up and referral plans. medication usage, Demonstrated understanding of               

      instructions, follow-up care, medications, Prescriptions given X 3.                         

17:50 Patient left the ED.                                                                    ld1 

                                                                                                  

Signatures:                                                                                       

Dispatcher [employer]                           EDMS                                                 

Heather Ojeda                                ds1                                                  

Victorina Damon, RN                     RN   Jacek Dillard, PA                         PA   cp                                                   

Paola Sommer, RN                     RN   ld1                                                  

                                                                                                  

**************************************************************************************************

## 2021-12-08 NOTE — RAD REPORT
EXAM DESCRIPTION:  CT - Spine Lumbar Wo Con - 12/8/2021 4:09 pm

 

CLINICAL HISTORY:   Radiculopathy.

LOWER BACK PAIN

 

COMPARISON:  No comparisons

 

TECHNIQUE:  Axial noncontrast CT imaging of the lumbar spine was performed with coronal and sagittal 
re-formatted images.

 

All CT scans are performed using dose optimization technique as appropriate and may include automated
 exposure control or mA/KV adjustment according to patient size.

 

FINDINGS:  Limited due to body habitus. An IVC filter is present.

 

No acute lumbar spine fracture seen. No aggressive marrow pattern or malalignment.

 

Paraspinal tissues are normal in thickness. No paraspinal abscess or hematoma seen. Mild broad-based 
disc bulges are present at L2-3, L3-4, and L5-S1. Though limited due to CT technique, no evidence of 
central spinal stenosis is appreciated. At least mild neural foraminal narrowing is noted at L4-5 and
 L5-S1 bilaterally.

 

Intervertebral disc disease assessment is inherently limited by CT. Within these limitations, no high
-grade canal stenosis suspected.

 

IMPRESSION:  No acute fracture of the lumbar spine. Mild degenerative disc disease but which is not w
ell evaluated due to CT technique and patient body habitus.

 

Consider MRI follow-up for assessment of disc disease if clinically desired.

## 2021-12-08 NOTE — EDPHYS
Physician Documentation                                                                           

 Former[facility]                                                                 

Name: Larry Corea                                                                                 

Age: 51 yrs                                                                                       

Sex: Male                                                                                         

: 1970                                                                                   

MRN: [id number]                                                                                   

Arrival Date: 2021                                                                          

Time: 13:32                                                                                       

Account#: [id number]                                                                            

Bed 12                                                                                            

Private MD:                                                                                       

ED Physician Flor Graham                                                                         

HPI:                                                                                              

                                                                                             

15:30 This 51 yrs old Male presents to ER via Wheelchair with complaints of Back Pain,        cp  

      Difficulty Walking.                                                                         

15:30 The patient presents with pain that is acute. The symptoms are located in the low back. cp  

      Onset: The symptoms/episode began/occurred last week. The problem was sustained started     

      after pushing motorcycle onto trailer.                                                      

15:30 The pain radiates to the right leg and right hip.                                       cp  

15:30 Associated signs and symptoms: Pertinent positives: weakness, Pertinent negatives:      cp  

      abdominal pain, constipation, incontinence, numbness, tingling, numbness. Severity of       

      symptoms: in the emergency department the symptoms are unchanged, despite home              

      interventions.                                                                              

                                                                                                  

Historical:                                                                                       

- Allergies:                                                                                      

14:44 Morphine;                                                                               iw  

- Home Meds:                                                                                      

14:44 Metformin Oral [Active]; cholesterol pills [Active]; blood pressure [Active];           iw  

- PMHx:                                                                                           

14:44 Diabetes - NIDDM; Gout; Hypertension; Hypercholesterolemia;                             iw  

                                                                                                  

- Immunization history:: Client reports having NOT received the Covid vaccine.                    

- Social history:: Smoking status: Patient reports the use of cigarette tobacco                   

  products, smokes one pack cigarettes per day.                                                   

                                                                                                  

                                                                                                  

ROS:                                                                                              

15:35 Back: Positive for pain at rest, pain with movement, of the lumbar area and right low   cp  

      back.                                                                                       

15:35 Constitutional: Negative for body aches, chills, fever.                                 cp  

15:35 Abdomen/GI: Negative for nausea, vomiting, and diarrhea, constipation, bowel                

      incontinence.                                                                               

15:35 : Negative for urinary symptoms, difficulty urinating, bladder incontinence,              

      testicular pain                                                                             

15:35 MS/extremity: Positive for pain, of the right leg and right hip, Negative for               

      paresthesias, tingling.                                                                     

15:35 Neuro: Positive for weakness, of the right leg, Negative for numbness, tingling, saddle     

      anesthesia.                                                                                 

15:35 Cardiovascular: Negative for chest pain.                                                cp  

15:35 Respiratory: Negative for cough, shortness of breath, wheezing.                         cp  

15:35 All other systems are negative.                                                             

                                                                                                  

Exam:                                                                                             

15:45 Constitutional: The patient appears in no acute distress, alert, awake, non-toxic, well cp  

      developed, well nourished, in obvious pain, uncomfortable.                                  

15:45 Head/Face:  Normocephalic, atraumatic.                                                  cp  

15:45 Neck: ROM/movement: is normal, is supple, without pain, no range of motions limitations.    

15:45 Chest/axilla: Inspection: normal.                                                           

15:45 Cardiovascular: Rate: normal, Rhythm: regular.                                              

15:45 Respiratory: the patient does not display signs of respiratory distress,  Respirations:     

      normal, no use of accessory muscles, no retractions, labored breathing, is not present.     

15:45 Abdomen/GI: Inspection: abdomen appears normal, Palpation: abdomen is soft and              

      non-tender, in all quadrants.                                                               

15:45 Back: pain, that is moderate, of the  lumbar area and right low back, ROM is painful,       

      with all movement, Straight leg raises: right lower extremity illicits pain, at 30          

      degrees.                                                                                    

15:45 Neuro: Orientation: to person, place \T\ time. Mentation: is normal, Motor: moves all       

      fours, strength is normal, Sensation: no obvious gross deficits, Gait: is steady, Deep      

      tendon reflexes are 2+ (normal) in the  right patellar, right Achilles, left patellar       

      and left Achilles.                                                                          

                                                                                                  

Vital Signs:                                                                                      

14:43  / 102; Pulse 68; Resp 16; Pulse Ox 96% on R/A; Weight 181.44 kg; Height 6 ft. 3  iw  

      in. (190.50 cm); Pain 6/10;                                                                 

15:01  / 99; Pulse 72; Resp 18; Pulse Ox 98% on R/A; Pain 8/10;                         ld1 

16:28  / 94; Pulse 76; Resp 18; Pulse Ox 98% on R/A;                                    ld1 

14:43 Body Mass Index 50.00 (181.44 kg, 190.50 cm)                                            iw  

                                                                                                  

MDM:                                                                                              

15:23 Patient medically screened.                                                             cp  

16:00 Differential diagnosis: sciatica, spinal stenosis, cauda equina.                        cp  

17:33 Data reviewed: vital signs, nurses notes, lab test result(s), radiologic studies, CT    cp  

      scan.                                                                                       

17:33 Counseling: I had a detailed discussion with the patient and/or guardian regarding: the cp  

      historical points, exam findings, and any diagnostic results supporting the                 

      discharge/admit diagnosis, lab results, radiology results, the need for outpatient          

      follow up, a family practitioner, to return to the emergency department if symptoms         

      worsen or persist or if there are any questions or concerns that arise at home.             

17:33 Response to treatment: the patient's symptoms have markedly improved after treatment,   cp  

      Pain improved with meds. Discussed results of radiology studies. Will discharge to home     

      for continued monitoring.                                                                   

17:42 ED course: inquiry of [place] prescription monitor website shows patient received #[id number]    cp  

      hydrocodone  on 2021.                                                           

                                                                                                  

                                                                                             

15:51 Order name: Urine Dipstick-Ancillary; Complete Time: 15:59                              EDMS

                                                                                             

15:59 Interpretation: Normal except: UPROT Trace.                                             cp  

                                                                                             

15:24 Order name: CT Lumbar Spine Wo Con; Complete Time: 17:25                                cp  

                                                                                             

15:24 Order name: Urine Dipstick-Ancillary (obtain specimen); Complete Time: 15:46            cp  

                                                                                                  

Administered Medications:                                                                         

15:46 Not Given (Other Intervention Used): Dilaudid (HYDROmorphone) 1 mg IM once; RASS on     ld1 

      ADMIN: Combtv4, Very Agttd3, Agttd2, Rstlss1, AlertClm0, Drwsy-1, Lt Sdtn-2, Mod            

      Sdtn-3, Dp Sdtn-4, UnArsble-5                                                               

15:46 Drug: Dilaudid (HYDROmorphone) 1 mg Route: IVP; Site: left antecubital;                 ld1 

15:46 Follow up: Response: No adverse reaction                                                ld1 

                                                                                                  

                                                                                                  

Disposition:                                                                                      

17:45 Chart complete.                                                                         cp  

22:29 Co-signature as Attending Physician, Flor Graham MD I agree with the assessment and     sp3 

      plan of care.                                                                               

                                                                                                  

Disposition Summary:                                                                              

21 17:34                                                                                    

Discharge Ordered                                                                                 

      Location: Home                                                                          cp  

      Problem: new                                                                            cp  

      Symptoms: have improved                                                                 cp  

      Condition: Stable                                                                       cp  

      Diagnosis                                                                                   

        - Low back pain                                                                       cp  

        - Radiculopathy, lumbosacral region                                                   cp  

      Followup:                                                                               cp  

        - With: Private Physician                                                                  

        - When: 2 - 3 days                                                                         

        - Reason: Recheck today's complaints                                                       

      Discharge Instructions:                                                                     

        - Discharge Summary Sheet                                                             cp  

        - Acute Back Pain, Adult                                                              cp  

        - Lumbosacral Radiculopathy                                                           cp  

        - Back Exercises                                                                      cp  

      Forms:                                                                                      

        - Medication Reconciliation Form                                                      cp  

        - Thank You Letter                                                                    cp  

        - Antibiotic Education                                                                cp  

        - Prescription Opioid Use                                                             cp  

      Prescriptions:                                                                              

        - Lidoderm 5 % Topical adhesive patch,medicated                                            

            - apply 1 patch by TOPICAL route once daily; 1 box; Refills: 0, Product Selection cp  

      Permitted                                                                                   

        - Cyclobenzaprine 10 mg Oral Tablet                                                        

            - take 1 tablet by ORAL route every 8 hours As needed; 20 tablet; Refills: 0,     cp  

      Product Selection Permitted                                                                 

        - Medrol (Curry) 4 mg Oral Tablets, Dose Pack                                                

            - take 1 tablet by ORAL route as directed - follow package instructions; 1        cp  

      packet; Refills: 0, Product Selection Permitted                                             

Signatures:                                                                                       

Dispatcher [employer]                           Victorina Pat RN                     RN   Jacek Dillard PA                         PA   cp                                                   

Paola Sommer RN                     RN   ld1                                                  

Flor Graham MD MD   sp3                                                  

                                                                                                  

Corrections: (The following items were deleted from the chart)                                    

                                                                                             

17:46  15:35 All other systems are negative, cp                                          cp  

                                                                                                  

**************************************************************************************************